# Patient Record
(demographics unavailable — no encounter records)

---

## 2024-10-15 NOTE — HEALTH RISK ASSESSMENT
[Patient reported colonoscopy was normal] : Patient reported colonoscopy was normal [Very Good] : ~his/her~  mood as very good [Yes] : Yes [2 - 4 times a month (2 pts)] : 2-4 times a month (2 points) [1 or 2 (0 pts)] : 1 or 2 (0 points) [Never (0 pts)] : Never (0 points) [No] : In the past 12 months have you used drugs other than those required for medical reasons? No [Former] : Former [0-4] : 0-4 [> 15 Years] : > 15 Years [Patient reported mammogram was normal] : Patient reported mammogram was normal [Patient declined PAP Smear] : Patient declined PAP Smear [With Family] : lives with family [Employed] : employed [] :  [# Of Children ___] : has [unfilled] children [Smoke Detector] : smoke detector [de-identified] : no exercise lately [de-identified] : trying to be balanced [Reports changes in hearing] : Reports no changes in hearing [Reports changes in vision] : Reports no changes in vision [Reports changes in dental health] : Reports no changes in dental health [MammogramDate] : 01/24 [ColonoscopyDate] : 10/19 [ColonoscopyComments] : due 2029 [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

## 2024-10-15 NOTE — PHYSICAL EXAM
[No Acute Distress] : no acute distress [Well-Appearing] : well-appearing [Normal Sclera/Conjunctiva] : normal sclera/conjunctiva [EOMI] : extraocular movements intact [Normal Outer Ear/Nose] : the outer ears and nose were normal in appearance [Normal Oropharynx] : the oropharynx was normal [No Lymphadenopathy] : no lymphadenopathy [Supple] : supple [Thyroid Normal, No Nodules] : the thyroid was normal and there were no nodules present [No Respiratory Distress] : no respiratory distress  [Clear to Auscultation] : lungs were clear to auscultation bilaterally [Normal Rate] : normal rate  [Regular Rhythm] : with a regular rhythm [Normal S1, S2] : normal S1 and S2 [Soft] : abdomen soft [Non Tender] : non-tender [Non-distended] : non-distended [No Rash] : no rash [Coordination Grossly Intact] : coordination grossly intact [No Focal Deficits] : no focal deficits [Normal Gait] : normal gait [Normal Affect] : the affect was normal [Normal Insight/Judgement] : insight and judgment were intact [de-identified] : umbilical hernia, reducible

## 2024-10-15 NOTE — COUNSELING
[Potential consequences of obesity discussed] : Potential consequences of obesity discussed [Benefits of weight loss discussed] : Benefits of weight loss discussed [Encouraged to increase physical activity] : Encouraged to increase physical activity [Target Wt Loss Goal ___] : Weight Loss Goals: Target weight loss goal [unfilled] lbs [Decrease Portions] : decrease portions [____ min/wk Activity] : [unfilled] min/wk activity [Keep Food Diary] : keep food diary [Needs reinforcement, provided] : Patient needs reinforcement on understanding of disease, goals and obesity follow-up plan; reinforcement was provided [FreeTextEntry4] : 15

## 2024-10-15 NOTE — REVIEW OF SYSTEMS
[Fever] : no fever [Chills] : no chills [Earache] : no earache [Nasal Discharge] : no nasal discharge [Sore Throat] : no sore throat [Chest Pain] : no chest pain [Palpitations] : no palpitations [Shortness Of Breath] : no shortness of breath [Cough] : no cough [Abdominal Pain] : no abdominal pain [Nausea] : no nausea [Constipation] : no constipation [Diarrhea] : diarrhea [Vomiting] : no vomiting [Dysuria] : no dysuria [Frequency] : no frequency [Itching] : no itching [Mole Changes] : no mole changes [Skin Rash] : no skin rash [Headache] : no headache [Dizziness] : no dizziness

## 2024-10-15 NOTE — HISTORY OF PRESENT ILLNESS
[FreeTextEntry1] : CPE [de-identified] : 59yo female with PMH of HLD, anxiety and depression presenting to the office for annual physical examination.   She is concerned about her weight.  She notes that she was always very fit, but after menopause she did gain weight. She was successful with Weight Watchers in the past, had lost 30 pounds but could not keep the weight off.  She is wondering if she would be a candidate for weight loss medications.  She endorses a history of an umbilical hernia after giving birth to her daughter 22 years ago. She feels like the hernia is more pronounced since she has gained more weight.

## 2024-10-15 NOTE — ASSESSMENT
[FreeTextEntry1] : #HCM - Patient presenting for annual physical exam - Accepts flu vaccine today, administered in left deltoid. Patient tolerated well - Due for repeat pap smear, patient to follow up with GYN - Up to date with mammogram - Up to date with colonoscopy, due in 2029 - ECG today shows NSR, rate 76. No changes from previous ECG - Will check routine blood work today and call patient with results   #Obesity  - Patient presenting for obesity management  - Patient has not been successful with lifestyle modifications such as reduced calorie diet and exercise regimen.  - Despite modifications made, patients BMI remains at 29, with comorbid HLD - She is concerned about her weight and would like to start medication for managing her weight - We had a long discussion regarding GLP-1 agonists as well as Bupropion, a medication she is currently taking for her anxiety and depression - She is currently on a low dose of Bupropion at 75mg and has not noticed benefit for her mental health. I advised we can increase to 150mg if desired as this medication can help with weight management - We will take bloodwork today to recheck A1c - Will discuss weight loss injectable further with patient   #Umbilical hernia - Umbilical hernia following birth of her daughter  - More pronounced with weight gain - Advised if weight better controlled, hernia may be less noticeable - If persists can discuss referral to general surgery

## 2025-01-13 NOTE — PHYSICAL EXAM
[No Acute Distress] : no acute distress [Well-Appearing] : well-appearing [Normal Sclera/Conjunctiva] : normal sclera/conjunctiva [EOMI] : extraocular movements intact [Normal Outer Ear/Nose] : the outer ears and nose were normal in appearance [Normal Oropharynx] : the oropharynx was normal [No Lymphadenopathy] : no lymphadenopathy [Supple] : supple [Thyroid Normal, No Nodules] : the thyroid was normal and there were no nodules present [No Respiratory Distress] : no respiratory distress  [Clear to Auscultation] : lungs were clear to auscultation bilaterally [Normal Rate] : normal rate  [Regular Rhythm] : with a regular rhythm [Normal S1, S2] : normal S1 and S2 [Soft] : abdomen soft [Non Tender] : non-tender [Non-distended] : non-distended [No Rash] : no rash [Coordination Grossly Intact] : coordination grossly intact [No Focal Deficits] : no focal deficits [Normal Gait] : normal gait [Normal Affect] : the affect was normal [Normal Insight/Judgement] : insight and judgment were intact [de-identified] : ventral hernia, reducible

## 2025-01-13 NOTE — ASSESSMENT
[Patient Optimized for Surgery] : Patient optimized for surgery [No Further Testing Recommended] : no further testing recommended [Continue medications as is] : Continue current medications [As per surgery] : as per surgery [FreeTextEntry4] : 58yo female with PMH of HLD, anxiety and depression who presents to the office for preoperative examination. Scheduled for robotic ventral hernia repair on 1/17/2025 at [facility] with Dr. Angel.  - PST labs obtained from [facility] and personally reviewed, all within normal limits - ECG from [facility]PE 10/2024 reviewed, NSR, rate 76 with no changes from previous - No adverse anesthesia reactions in the past - Able to perform >4 METs at baseline - At this time there are no acute medical contraindications to procedure and patient is medically optimized to proceed with planned procedure

## 2025-01-13 NOTE — HISTORY OF PRESENT ILLNESS
[(Patient denies any chest pain, claudication, dyspnea on exertion, orthopnea, palpitations or syncope)] : Patient denies any chest pain, claudication, dyspnea on exertion, orthopnea, palpitations or syncope [Moderate (4-6 METs)] : Moderate (4-6 METs) [Aortic Stenosis] : no aortic stenosis [Atrial Fibrillation] : no atrial fibrillation [Coronary Artery Disease] : no coronary artery disease [Recent Myocardial Infarction] : no recent myocardial infarction [Implantable Device/Pacemaker] : no implantable device/pacemaker [Asthma] : no asthma [COPD] : no COPD [Sleep Apnea] : no sleep apnea [Smoker] : not a smoker [Family Member] : no family member with adverse anesthesia reaction/sudden death [Self] : no previous adverse anesthesia reaction [Chronic Anticoagulation] : no chronic anticoagulation [Chronic Kidney Disease] : no chronic kidney disease [Diabetes] : no diabetes [FreeTextEntry1] : Robotic ventral hernia repair [FreeTextEntry2] : 1/17/2025 [FreeTextEntry3] : Dr. Favian Angel [FreeTextEntry4] : 58yo female with PMH of HLD, anxiety and depression who presents to the office for preoperative examination.   Patient with history of a ventral hernia since the birth of her daughter 22 years ago. She reports she has been keeping an eye on the hernia for this amount of time. She notes that occasionally she will experience discomfort in the area. Over the [place] holidays, patient had worsening pain with constipation. She saw general surgeon Dr. Angel and underwent CT abdomen which showed a small bowel obstruction. Scheduled for robotic ventral hernia repair on 1/17/2025 at [facility] with Dr. Angel.   She is nervous about the upcoming procedure.  Denies any further abdominal pain or constipation. Has been stooling regularly now.

## 2025-04-07 NOTE — ASSESSMENT
[FreeTextEntry1] : #Obesity  - Patient presenting for obesity management and would benefit from GLP therapy as they have tried and failed Bupropion and have not been successful with lifestyle modifications such as reduced calorie diet and exercise regimen.  - Despite modifications made, patients BMI remains at 30.27, weight 205lbs  - Starting Zepbound as an adjunct to diet and exercise would assist the patient with significant weight loss and ultimately improve health outcomes by reducing the risk of developing weight related health conditions. - Patient was previously approved for Zepbound but did not start medication because she was concerned to start this prior to hernia repair surgery, ready to start weight loss injectables

## 2025-04-07 NOTE — REVIEW OF SYSTEMS
[Chest Pain] : no chest pain [Palpitations] : no palpitations [Shortness Of Breath] : no shortness of breath [Abdominal Pain] : no abdominal pain [Nausea] : no nausea [Constipation] : no constipation [Diarrhea] : diarrhea [Vomiting] : no vomiting [Headache] : no headache [Dizziness] : no dizziness

## 2025-04-07 NOTE — HISTORY OF PRESENT ILLNESS
[FreeTextEntry1] : Follow up [de-identified] : 60yo female with PMH of HLD, anxiety and depression presenting to the office for follow up visit.  She is s/p ventral hernia repair in 1/2025 with Dr Angel. She report the surgery was very successful.   She is concerned about her weight. We did discuss this at her annual physical examination 10/2024. She was approved for Zepbound by her insurance but was too concerned about starting the medication prior to her surgery, so never started the medication.  She notes that she was always very fit, but after menopause she did gain weight. She was successful with Weight Watchers in the past, had lost 30 pounds but could not keep the weight off. Started Weight Watchers again and was unable to lose weight.

## 2025-06-02 NOTE — REVIEW OF SYSTEMS
[Constipation] : constipation [Chest Pain] : no chest pain [Palpitations] : no palpitations [Shortness Of Breath] : no shortness of breath [Abdominal Pain] : no abdominal pain [Nausea] : no nausea [Diarrhea] : diarrhea [Vomiting] : no vomiting [Headache] : no headache [Dizziness] : no dizziness

## 2025-06-02 NOTE — ASSESSMENT
[FreeTextEntry1] : #Obesity - Patient presenting for follow up of obesity treatment. - Patient requires continuation of therapy for Zepbound for continued weight loss. - Patient started with a BMI of 30.27 and weight of 205lbs. Patient's current BMI is 27.17 and weight is 184lbs resulting in at least a 5% weight reduction. - GLP therapy for this patient has proven to be successful and they would benefit from continuation of therapy. - Continue Zepbound 5mg weekly - Advised to contact office if reaches weight loss plateau and can increase dosage if needed - F/u in 3 months for weight check

## 2025-06-02 NOTE — HISTORY OF PRESENT ILLNESS
[FreeTextEntry1] : Follow up [de-identified] : 58yo female with PMH of HLD, anxiety and depression presenting to the office for follow up visit.  Presenting for weight management follow up. Currently taking Zepbound 5mg weekly. Patient has lost 21 pounds on injectable GLP-1 agonist therapy. Happy with weight loss. Patient denies any headaches, dizziness, abdominal pain, nausea, vomiting, diarrhea or constipation. Has increased protein in her diet, following low carb/low sugar.